# Patient Record
Sex: MALE | Race: WHITE
[De-identification: names, ages, dates, MRNs, and addresses within clinical notes are randomized per-mention and may not be internally consistent; named-entity substitution may affect disease eponyms.]

---

## 2018-08-05 ENCOUNTER — HOSPITAL ENCOUNTER (EMERGENCY)
Dept: HOSPITAL 76 - ED | Age: 23
Discharge: HOME | End: 2018-08-05
Payer: COMMERCIAL

## 2018-08-05 VITALS — DIASTOLIC BLOOD PRESSURE: 77 MMHG | SYSTOLIC BLOOD PRESSURE: 133 MMHG
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
## 2018-08-05 DIAGNOSIS — S63.502A: ICD-10-CM

## 2018-08-05 DIAGNOSIS — S52.125A: Primary | ICD-10-CM

## 2018-08-05 DIAGNOSIS — S52.124A: ICD-10-CM

## 2018-08-05 PROCEDURE — 73110 X-RAY EXAM OF WRIST: CPT

## 2018-08-05 PROCEDURE — 99283 EMERGENCY DEPT VISIT LOW MDM: CPT

## 2018-08-05 PROCEDURE — 73080 X-RAY EXAM OF ELBOW: CPT

## 2018-08-05 NOTE — XRAY REPORT
Procedure Date:  08/05/2018   

Accession Number:  [id number] / [id number]                    

Procedure:  XR  - Elbow 3 View BILAT CPT Code:  

 

FULL RESULT:

 

 

EXAM:

1. Right Elbow Radiography

2. Left Elbow Radiography

 

EXAM DATE: 8/5/2018 08:52 PM.

 

CLINICAL HISTORY: Bilateral elbow pain after fall 3 days ago.

 

COMPARISON: None.

 

TECHNIQUE: 3 views each elbow.

 

FINDINGS:

Right:

Bones: Non-displaced intra-articular radial head fracture.

 

Joints: Normal alignment. An elbow effusion is present.

 

Soft Tissues: Unremarkable.

 

Left:

Bones: Minimally impacted intra-articular fracture of the anterolateral 

radial head.

 

Joints: Normal alignment. An elbow effusion is present.

 

Soft Tissues: Unremarkable.

IMPRESSION:

1. Right elbow: Nondisplaced intra-articular radial head fracture with 

associated elbow effusion.

2. Left elbow: Minimally impacted intra-articular radial head fracture 

with associated elbow effusion.

 

[facility]

## 2018-08-05 NOTE — XRAY REPORT
Procedure Date:  08/05/2018   

Accession Number:  [id number] / [id number]                    

Procedure:  XR  - Wrist 4 View LT CPT Code:  

 

FULL RESULT:

 

 

EXAM:

LEFT WRIST RADIOGRAPHY

 

EXAM DATE: 8/5/2018 08:52 PM.

 

CLINICAL HISTORY: Left wrist pain after fall 3 days ago

 

COMPARISON: None.

 

TECHNIQUE: 4 views.

 

FINDINGS:

Bones: Normal. No fractures or bone lesions.

 

Joints: Normal. No subluxations.

 

Soft Tissues: Normal. No soft tissue swelling.

IMPRESSION: Normal wrist radiography.

 

[facility]

## 2018-08-05 NOTE — ED PHYSICIAN DOCUMENTATION
PD HPI UPPER EXT INJURY





- Stated complaint


Stated Complaint: BILATE ELBOW INJURY





- Chief complaint


Chief Complaint: Ext Problem





- History obtained from


History obtained from: Patient, Friend





- History of Present Illness


Location: Other (He was at a concert 2 days ago and fell down an incline onto 

outstretched arms bilaterally.  He complains of left wrist and bilateral elbow 

pain.  No other injuries.)





Review of Systems


Constitutional: denies: Fever, Chills


Cardiac: reports: Reviewed and negative


Respiratory: reports: Reviewed and negative


: reports: Reviewed and negative





PD PAST MEDICAL HISTORY





- Present Medications


Home Medications: 


 Ambulatory Orders











 Medication  Instructions  Recorded  Confirmed


 


Acetaminophen [Tylenol] 650 mg VA PRN 08/05/18 


 


HYDROcod/ACETAM 5/325 [Norco 5/325] 1 - 2 ea PO Q6H PRN #15 tablet 08/05/18 














- Allergies


Allergies/Adverse Reactions: 


 Allergies











Allergy/AdvReac Type Severity Reaction Status Date / Time


 


No Known Drug Allergies Allergy   Verified 08/05/18 20:09














PD ED PE NORMAL





- Vitals


Vital signs reviewed: Yes





- General


General: Alert and oriented X 3, No acute distress





- HEENT


HEENT: PERRL, EOMI





- Neck


Neck: Supple, no meningeal sign, No bony TTP





- Cardiac


Cardiac: RRR, No murmur





- Respiratory


Respiratory: No respiratory distress, Clear bilaterally





- Abdomen


Abdomen: Non tender, Non distended





- Extremities


Extremities: Other (He is tender over both radial heads, right greater than 

left without supracondylar tenderness.  There is no tenderness of the left 

elbow per se but he is significantly limited range of motion in extension.)





- Neuro


Neuro: Alert and oriented X 3, Normal speech





Results





- Vitals


Vitals: 


 Vital Signs - 24 hr











  08/05/18





  20:07


 


Temperature 36.8 C


 


Heart Rate 81


 


Respiratory 16





Rate 


 


Blood Pressure 133/77 H


 


O2 Saturation 100














- Rads (name of study)


  ** Left wrist x-ray


Radiology: EMP read contemporaneously (Negative)





  ** X-rays of both elbows


Radiology: EMP read contemporaneously (Bilateral radial head fractures)





PD MEDICAL DECISION MAKING





- Sepsis Event


Vital Signs: 


 Vital Signs - 24 hr











  08/05/18





  20:07


 


Temperature 36.8 C


 


Heart Rate 81


 


Respiratory 16





Rate 


 


Blood Pressure 133/77 H


 


O2 Saturation 100














Departure





- Departure


Disposition: 01 Home, Self Care


Clinical Impression: 


Left radial head fracture


Qualifiers:


 Encounter type: initial encounter Fracture type: closed Fracture alignment: 

nondisplaced Qualified Code(s): S52.125A - Nondisplaced fracture of head of 

left radius, initial encounter for closed fracture





Right radial head fracture


Qualifiers:


 Encounter type: initial encounter Fracture type: closed Fracture alignment: 

nondisplaced Qualified Code(s): S52.124A - Nondisplaced fracture of head of 

right radius, initial encounter for closed fracture





Left wrist sprain


Qualifiers:


 Encounter type: initial encounter Qualified Code(s): S63.502A - Unspecified 

sprain of left wrist, initial encounter





Condition: Good


Record reviewed to determine appropriate education?: Yes


Instructions:  ED Fx Radial Head


Prescriptions: 


HYDROcod/ACETAM 5/325 [Norco 5/325] 1 - 2 ea PO Q6H PRN #15 tablet


 PRN Reason: Pain


Comments: 


Go to the orthopedics desk at the [facility] tomorrow with a copy of the x-

rays on CD.  Return if worsening.  Keep the sling was on until you are told 

otherwise by the orthopedic surgeon on [facility].


Forms:  Activity restrictions